# Patient Record
Sex: MALE | Race: BLACK OR AFRICAN AMERICAN | NOT HISPANIC OR LATINO | Employment: FULL TIME | URBAN - METROPOLITAN AREA
[De-identification: names, ages, dates, MRNs, and addresses within clinical notes are randomized per-mention and may not be internally consistent; named-entity substitution may affect disease eponyms.]

---

## 2023-10-06 ENCOUNTER — HOSPITAL ENCOUNTER (EMERGENCY)
Facility: HOSPITAL | Age: 28
Discharge: HOME | End: 2023-10-06
Admitting: PHYSICIAN ASSISTANT

## 2023-10-06 VITALS
TEMPERATURE: 97.8 F | SYSTOLIC BLOOD PRESSURE: 127 MMHG | RESPIRATION RATE: 18 BRPM | HEART RATE: 74 BPM | OXYGEN SATURATION: 98 % | WEIGHT: 155 LBS | DIASTOLIC BLOOD PRESSURE: 81 MMHG

## 2023-10-06 DIAGNOSIS — M54.16 LUMBAR BACK PAIN WITH RADICULOPATHY AFFECTING RIGHT LOWER EXTREMITY: ICD-10-CM

## 2023-10-06 DIAGNOSIS — M54.41 ACUTE RIGHT-SIDED LOW BACK PAIN WITH RIGHT-SIDED SCIATICA: ICD-10-CM

## 2023-10-06 PROCEDURE — 99284 EMERGENCY DEPT VISIT MOD MDM: CPT

## 2023-10-06 PROCEDURE — 250N000013 HC RX MED GY IP 250 OP 250 PS 637: Performed by: PHYSICIAN ASSISTANT

## 2023-10-06 RX ORDER — PREDNISONE 20 MG/1
TABLET ORAL
Qty: 10 TABLET | Refills: 0 | Status: SHIPPED | OUTPATIENT
Start: 2023-10-06

## 2023-10-06 RX ORDER — CYCLOBENZAPRINE HCL 10 MG
10 TABLET ORAL 3 TIMES DAILY PRN
Qty: 18 TABLET | Refills: 0 | Status: SHIPPED | OUTPATIENT
Start: 2023-10-06 | End: 2023-10-12

## 2023-10-06 RX ORDER — LIDOCAINE 4 G/G
1 PATCH TOPICAL ONCE
Status: DISCONTINUED | OUTPATIENT
Start: 2023-10-06 | End: 2023-10-06 | Stop reason: HOSPADM

## 2023-10-06 RX ADMIN — LIDOCAINE 1 PATCH: 4 PATCH TOPICAL at 10:04

## 2023-10-06 NOTE — ED TRIAGE NOTES
The pt arrives with a two day hx of lower right back pain, had this a year ago as well. Denies trauma, no numbness or loss of bowel or bladder. Took IBU this morning.      Triage Assessment       Row Name 10/06/23 0949       Triage Assessment (Adult)    Airway WDL WDL       Cognitive/Neuro/Behavioral WDL    Cognitive/Neuro/Behavioral WDL WDL

## 2023-10-06 NOTE — DISCHARGE INSTRUCTIONS
You are seen here in the emergency department for evaluation of right-sided lower back pain, with sciatica.  This seems very consistent with your previous.  Use ibuprofen and Tylenol, I given you a note for work today, stretch and try to stay active is much as possible, however avoid significant amount of climbing or walking.  Dosage recommendations are included below for ibuprofen and Tylenol, additionally have sent some steroids, as well as some Flexeril to your pharmacy.    For pain or fever you may use:  -Tylenol 650 mg every 6 hours.  Max 4000 mg in 24 hours  Do not use thismedication with alcohol as it can cause liver problems.  -Ibuprofen 600 mg every 6 hours.  Max 3500 mg in 24 hours  Do not take this medication if you have a history of a GI bleed or have kidney problems.  You may use both of these medications at the same time or you can alternate them every 3 hours.  For example, Tylenol at 6 AM, ibuprofen at 9 AM, Tylenol at noon, etc.

## 2023-10-06 NOTE — ED PROVIDER NOTES
EMERGENCY DEPARTMENT ENCOUNTER      NAME: Vipin Flowers  AGE: 27 year old male  YOB: 1995  MRN: [id number]  EVALUATION DATE & TIME: No admission date for patient encounter.    PCP: No primary care provider on file.    ED PROVIDER: Claude Cardoso PA-C      Chief Complaint   Patient presents with    Back Pain       FINAL IMPRESSION:  1. Lumbar back pain with radiculopathy affecting right lower extremity    2. Acute right-sided low back pain with right-sided sciatica        ED COURSE & MEDICAL DECISION MAKING:    Pertinent Labs & Imaging studies reviewed. (See chart for details)  9:49 AM I met the patient and performed my initial interview and exam. Discussed plan for medications, discharge.     27 year old male presents to the Emergency Department for evaluation of right sided low back pain, right sided sciatica    ED Course as of 10/06/23 1017   Fri Oct 06, 2023   1001 Patient is a 27-year-old male, no significant past medical history, presents emergency department for evaluation of right-sided lower back pain.  Patient reports a history of sciatica in the past, and this feels very similar.  Pain in the posterior aspect of his right hip, and radiating down his right leg.  No numbness or tingling.  No nausea or vomiting.  No loss control bowel or bladder.  He is good strength and sensation upper and lower extremities bilaterally, no red flag neurologic signs, nothing concerning for cauda equina.  No fevers.  Notes that he works frequently, walks frequently at work, does a lot of heavy lifting, which may exacerbate his symptoms.  He reports that he was on a course of steroids, had a steroid injection before, as well as muscle relaxers and that helped his symptoms in the past.  We will send a prescription for muscle relaxers, as well as steroids to his pharmacy.  He is agreeable with this plan.  We will give a dose of lidocaine patch here in the emergency department.  Exam overall consistent with  right-sided sciatica, right-sided low back pain.  Unfortunately, the patient drove himself here, cannot give him any stronger medications, or muscle relaxers.  Will give lidocaine patch, and discharge with work note, and prescriptions at the pharmacy.  He is agreeable with this plan.     Medical Decision Making    History:  Supplemental history from: Documented in chart, if applicable  External Record(s) reviewed: Documented in chart, if applicable.    Work Up:  Chart documentation includes differential considered and any EKGs or imaging independently interpreted by provider, where specified.  In additional to work up documented, I considered the following work up: Documented in chart, if applicable.    External consultation:  Discussion of management with another provider: Documented in chart, if applicable    Complicating factors:  Care impacted by chronic illness: N/A  Care affected by social determinants of health: N/A    Disposition considerations: Discharge. I prescribed additional prescription strength medication(s) as charted. N/A.       At the conclusion of the encounter I discussed the results of all of the tests and the disposition. The questions were answered. The patient or family acknowledged understanding and was agreeable with the care plan.       0 minutes of critical care time     MEDICATIONS GIVEN IN THE EMERGENCY:  Medications   Lidocaine (LIDOCARE) 4 % Patch 1 patch (has no administration in time range)       NEW PRESCRIPTIONS STARTED AT TODAY'S ER VISIT  New Prescriptions    CYCLOBENZAPRINE (FLEXERIL) 10 MG TABLET    Take 1 tablet (10 mg) by mouth 3 times daily as needed for muscle spasms    PREDNISONE (DELTASONE) 20 MG TABLET    Take two tablets (= 40mg) each day for 5 (five) days       =================================================================    HPI    Patient information was obtained from: Patient     Use of : N/A       Vipin Floewrs is a 27 year old male with a pertinent  history of sided sciatica who presents to this ED for evaluation of right-sided low back pain, right-sided sciatica.  Patient reports that symptoms have been ongoing since yesterday, had similar episode of this last year, and resolved.  Denies any recent trauma.  No loss control bowel or bladder.  No pain with urination.  No fevers.  No blood in the urine.  No concern for sexually transmitted infections.  No dizziness or lightheadedness.  Did take some ibuprofen this morning.  Reports that he walks and lifts a lot for work.  Was previously diagnosed with right-sided sciatica, this feels very similar.    PAST MEDICAL HISTORY:  No past medical history on file.    PAST SURGICAL HISTORY:  No past surgical history on file.        CURRENT MEDICATIONS:    cyclobenzaprine (FLEXERIL) 10 MG tablet  predniSONE (DELTASONE) 20 MG tablet         ALLERGIES:  No Known Allergies    FAMILY HISTORY:  No family history on file.    SOCIAL HISTORY:        VITALS:  BP (!) 140/69   Pulse 71   Temp 97.8  F (36.6  C) (Oral)   Resp 18   Wt 70.3 kg (155 lb)   SpO2 98%     PHYSICAL EXAM    Physical Exam  Vitals and nursing note reviewed.   Constitutional:       General: He is not in acute distress.     Appearance: Normal appearance. He is normal weight. He is not toxic-appearing or diaphoretic.   HENT:      Right Ear: External ear normal.      Left Ear: External ear normal.   Eyes:      Conjunctiva/sclera: Conjunctivae normal.   Cardiovascular:      Rate and Rhythm: Normal rate and regular rhythm.      Pulses: Normal pulses.   Pulmonary:      Effort: Pulmonary effort is normal.   Abdominal:      General: Abdomen is flat. There is no distension.      Tenderness: There is no abdominal tenderness. There is no right CVA tenderness, left CVA tenderness, guarding or rebound.   Musculoskeletal:         General: Tenderness present.      Comments: Tenderness in the right hip, radiating down the lateral aspect of the right leg, consistent with  sciatica.   Skin:     Findings: No erythema or rash.   Neurological:      Mental Status: He is alert. Mental status is at baseline.      Sensory: No sensory deficit.      Motor: No weakness.         LAB:  All pertinent labs reviewed and interpreted.  Labs Ordered and Resulted from Time of ED Arrival to Time of ED Departure - No data to display    RADIOLOGY:  Reviewed all pertinent imaging. Please see official radiology report.  No orders to display     PROCEDURES:   None.       Claude Cardoso PA-C  Emergency Medicine  [facility] EMERGENCY DEPARTMENT  [facility] [facility] [address]  [phone]  Dept: [phone]       Claude Cardoso PA-C  10/06/23 1018

## 2023-10-06 NOTE — Clinical Note
Vipin Flowers was seen and treated in our emergency department on 10/6/2023.  He may return to work on 10/09/2023.       If you have any questions or concerns, please don't hesitate to call.      Claude Cardoso PA-C